# Patient Record
Sex: MALE | Race: ASIAN | NOT HISPANIC OR LATINO
[De-identification: names, ages, dates, MRNs, and addresses within clinical notes are randomized per-mention and may not be internally consistent; named-entity substitution may affect disease eponyms.]

---

## 2020-10-08 PROBLEM — Z00.00 ENCOUNTER FOR PREVENTIVE HEALTH EXAMINATION: Status: ACTIVE | Noted: 2020-10-08

## 2020-10-21 ENCOUNTER — APPOINTMENT (OUTPATIENT)
Dept: GASTROENTEROLOGY | Facility: CLINIC | Age: 65
End: 2020-10-21
Payer: MEDICAID

## 2020-10-21 VITALS
SYSTOLIC BLOOD PRESSURE: 145 MMHG | BODY MASS INDEX: 26.48 KG/M2 | OXYGEN SATURATION: 100 % | HEART RATE: 80 BPM | TEMPERATURE: 98 F | HEIGHT: 70 IN | DIASTOLIC BLOOD PRESSURE: 79 MMHG | WEIGHT: 185 LBS

## 2020-10-21 DIAGNOSIS — Z86.79 PERSONAL HISTORY OF OTHER DISEASES OF THE CIRCULATORY SYSTEM: ICD-10-CM

## 2020-10-21 DIAGNOSIS — Z86.39 PERSONAL HISTORY OF OTHER ENDOCRINE, NUTRITIONAL AND METABOLIC DISEASE: ICD-10-CM

## 2020-10-21 PROCEDURE — 99072 ADDL SUPL MATRL&STAF TM PHE: CPT

## 2020-10-21 PROCEDURE — 99203 OFFICE O/P NEW LOW 30 MIN: CPT | Mod: 25

## 2020-10-21 RX ORDER — METFORMIN HYDROCHLORIDE 500 MG/1
500 TABLET, COATED ORAL
Refills: 0 | Status: ACTIVE | COMMUNITY

## 2020-10-21 RX ORDER — ASPIRIN 81 MG/1
81 TABLET ORAL
Refills: 0 | Status: ACTIVE | COMMUNITY

## 2020-10-21 RX ORDER — AMLODIPINE BESYLATE 10 MG/1
10 TABLET ORAL
Refills: 0 | Status: ACTIVE | COMMUNITY

## 2020-10-21 NOTE — HISTORY OF PRESENT ILLNESS
[FreeTextEntry1] : Mand[place] , [id number], [place]\par \par 66 yo male here for evaluation after an abnormal MRCP.  Pt had an MRCP on 9/23/20 showing a 1.9 x 1.5 x 1.7 cm cystic lesion in the pancreatic uncinate process with enhancing septations, communicating with diffusely dilated main PD (6 mm).   PD dilation new since 2/25/19.  No abdominal pain.  No diarrhea or constipation.  No BRBPR, no dark stools.  Appetite has not changed, weight has decreased a little.  + chronic gastritis.  No heartburn or reflux.  Last EKG was done in August by Dr. Chatman.\par \par No famhx  of stomach, colon or pancreatic cancer.  No IBD.\par \par , retired.  \par \par

## 2020-10-21 NOTE — ASSESSMENT
[FreeTextEntry1] : 64 yo male with pancreatic cyst, dilated PD -- possible misxed type IPMN\par \par - Will arrange an EUS evaluation of cyst and panceratic duct with a possible FNA\par - D/w pt regarding COVID testing pre-procedure as well as escort post-procedure\par - Risks of the procedure including bleeding, perforation, etc d/w the patient\par - Will obtain prior EKG from Dr. Chatman\par - Check CBC, CMP and CA 19-9 at Covid testing\par - Will obtain MRCP CD as well\par

## 2021-03-10 ENCOUNTER — LABORATORY RESULT (OUTPATIENT)
Age: 66
End: 2021-03-10

## 2021-03-11 ENCOUNTER — APPOINTMENT (OUTPATIENT)
Dept: GASTROENTEROLOGY | Facility: HOSPITAL | Age: 66
End: 2021-03-11

## 2021-03-11 ENCOUNTER — OUTPATIENT (OUTPATIENT)
Dept: OUTPATIENT SERVICES | Facility: HOSPITAL | Age: 66
LOS: 1 days | Discharge: HOME | End: 2021-03-11
Payer: MEDICARE

## 2021-03-11 ENCOUNTER — RESULT REVIEW (OUTPATIENT)
Age: 66
End: 2021-03-11

## 2021-03-11 LAB — GLUCOSE BLDC GLUCOMTR-MCNC: 155 MG/DL — HIGH (ref 70–99)

## 2021-03-11 PROCEDURE — 88173 CYTOPATH EVAL FNA REPORT: CPT | Mod: 26

## 2021-03-11 PROCEDURE — 88173 CYTOPATH EVAL FNA REPORT: CPT

## 2021-03-11 PROCEDURE — 43242 EGD US FINE NEEDLE BX/ASPIR: CPT

## 2021-03-11 PROCEDURE — 82962 GLUCOSE BLOOD TEST: CPT

## 2021-03-11 PROCEDURE — 88305 TISSUE EXAM BY PATHOLOGIST: CPT

## 2021-03-11 PROCEDURE — 88305 TISSUE EXAM BY PATHOLOGIST: CPT | Mod: 26

## 2021-03-11 RX ORDER — CIPROFLOXACIN HYDROCHLORIDE 500 MG/1
500 TABLET, FILM COATED ORAL
Qty: 6 | Refills: 0 | Status: ACTIVE | COMMUNITY
Start: 2021-03-11 | End: 1900-01-01

## 2021-03-16 LAB — NON-GYNECOLOGICAL CYTOLOGY STUDY: SIGNIFICANT CHANGE UP

## 2021-05-26 ENCOUNTER — APPOINTMENT (OUTPATIENT)
Dept: GASTROENTEROLOGY | Facility: CLINIC | Age: 66
End: 2021-05-26
Payer: MEDICARE

## 2021-05-26 VITALS
SYSTOLIC BLOOD PRESSURE: 122 MMHG | TEMPERATURE: 98.6 F | WEIGHT: 166 LBS | BODY MASS INDEX: 23.77 KG/M2 | OXYGEN SATURATION: 97 % | DIASTOLIC BLOOD PRESSURE: 75 MMHG | HEIGHT: 70 IN | HEART RATE: 81 BPM

## 2021-05-26 DIAGNOSIS — K86.2 CYST OF PANCREAS: ICD-10-CM

## 2021-05-26 PROCEDURE — 99213 OFFICE O/P EST LOW 20 MIN: CPT

## 2021-05-27 PROBLEM — K86.2 PANCREAS CYST: Status: ACTIVE | Noted: 2020-10-21

## 2021-05-27 LAB — CANCER AG19-9 SERPL-ACNC: 12 U/ML

## 2021-05-27 NOTE — ASSESSMENT
[FreeTextEntry1] : 66 yo male with pancreatic cyst & dilated PD -- possible mixed type IPMN -- rare, atypical cells\par \par - Will check a CA 19-9\par - Will review imaging at [facility] conference\par - Need to obtain MRI CD with images from [facility]\par - F/u in one month

## 2021-05-27 NOTE — HISTORY OF PRESENT ILLNESS
[FreeTextEntry1] : [place] , [place] [id number]\par \par 66 yo male here for f/u after EGD-EUS at [facility].  No abdominal pain.  Sometimes a little abdominal discomfort.  No N/V/D, no constipation.  No weight loss, no change in appetite.  No BRBPR, no dark stools.  No heartburn or reflux.  No abdominal pains after EUS at [facility].  No famhx of stomach, colon or pancreatic cancer. No IBD.  Planning to go to [place] in July and stay for 4-6 months.     3/11/21 EGD-EUS showed a small hiatal hernia, moderate gastric erythema, normal papilla next to a small diverticulum.  The PD measured 5 mm in the head, 7 mm in the body, 3 mm in the tail, and 6 mm at the ampulla; a cyst was seen in the uncinate process measuring 25 mm by 16 mm with a thick septation, two passes were made (cyto --> very rare, atypical ductal cells in a background of cyst contents).\par \par No famhx of stomach, colon or pancreatic cancer. No IBD.\par \par , retired. \par

## 2021-06-30 ENCOUNTER — APPOINTMENT (OUTPATIENT)
Dept: GASTROENTEROLOGY | Facility: CLINIC | Age: 66
End: 2021-06-30
Payer: MEDICARE

## 2021-06-30 VITALS
BODY MASS INDEX: 23.91 KG/M2 | WEIGHT: 167 LBS | HEART RATE: 72 BPM | SYSTOLIC BLOOD PRESSURE: 131 MMHG | DIASTOLIC BLOOD PRESSURE: 78 MMHG | OXYGEN SATURATION: 97 % | HEIGHT: 70 IN | TEMPERATURE: 98 F

## 2021-06-30 DIAGNOSIS — D49.0 NEOPLASM OF UNSPECIFIED BEHAVIOR OF DIGESTIVE SYSTEM: ICD-10-CM

## 2021-06-30 DIAGNOSIS — K86.89 OTHER SPECIFIED DISEASES OF PANCREAS: ICD-10-CM

## 2021-06-30 PROCEDURE — 99213 OFFICE O/P EST LOW 20 MIN: CPT

## 2021-06-30 NOTE — HISTORY OF PRESENT ILLNESS
[FreeTextEntry1] :  [id number], [place] -- mandarin\par \par 64 yo male here for f/u of what appears to be a junctional IPMN.  No abdominal pain.  No N/V/D, rare constipation.  No heartburn, no reflux.  Little weight loss, no change in appetite. No BRBPR, no dark stools. Pt is planning on going to [place] 7/21/21, plans on spending 3-4 months there.\par \par 5/26/21 CA 19-9 was 12, normal.\par \par 3/11/21 EGD-EUS showed a small hiatal hernia, moderate gastric erythema, normal papilla next to a small diverticulum. The PD measured 5 mm in the head, 7 mm in the body, 3 mm in the tail, and 6 mm at the ampulla; a cyst was seen in the uncinate process measuring 25 mm by 16 mm with a thick septation, two passes were made (cyto --> very rare, atypical ductal cells in a background of cyst contents).\par \par 9/23/20 MRCP showing a 1.9 x 1.5 x 1.7 cm cystic lesion in the pancreatic uncinate process with enhancing septations, communicating with diffusely dilated main PD (6 mm). PD dilation new since 2/25/19.\par \par No famhx of stomach, colon or pancreatic cancer. No IBD.\par \par , retired.

## 2021-06-30 NOTE — ASSESSMENT
[FreeTextEntry1] : 66 yo male with IPM<N of the uncinate with accompanying dilation of the pancreatic duct\par \par - Will refer to Dr. Rodriguez for surgical consultation to discuss possible resection\par - F/u in office in 3-4 months

## 2021-07-09 ENCOUNTER — NON-APPOINTMENT (OUTPATIENT)
Age: 66
End: 2021-07-09

## 2021-10-20 ENCOUNTER — APPOINTMENT (OUTPATIENT)
Dept: GASTROENTEROLOGY | Facility: CLINIC | Age: 66
End: 2021-10-20